# Patient Record
Sex: FEMALE | URBAN - METROPOLITAN AREA
[De-identification: names, ages, dates, MRNs, and addresses within clinical notes are randomized per-mention and may not be internally consistent; named-entity substitution may affect disease eponyms.]

---

## 2021-07-08 ENCOUNTER — OFFICE VISIT (OUTPATIENT)
Dept: URBAN - METROPOLITAN AREA CLINIC 25 | Facility: CLINIC | Age: 76
End: 2021-07-08

## 2024-01-26 ENCOUNTER — OFFICE VISIT (OUTPATIENT)
Dept: URBAN - METROPOLITAN AREA CLINIC 84 | Facility: CLINIC | Age: 79
End: 2024-01-26
Payer: MEDICARE

## 2024-01-26 VITALS
WEIGHT: 229.4 LBS | HEIGHT: 61 IN | BODY MASS INDEX: 43.31 KG/M2 | TEMPERATURE: 97.6 F | HEART RATE: 59 BPM | DIASTOLIC BLOOD PRESSURE: 63 MMHG | SYSTOLIC BLOOD PRESSURE: 131 MMHG

## 2024-01-26 DIAGNOSIS — R10.84 GENERALIZED ABDOMINAL PAIN: ICD-10-CM

## 2024-01-26 DIAGNOSIS — K52.89 (LYMPHOCYTIC) MICROSCOPIC COLITIS: ICD-10-CM

## 2024-01-26 DIAGNOSIS — R11.0 NAUSEA: ICD-10-CM

## 2024-01-26 DIAGNOSIS — E13.43: ICD-10-CM

## 2024-01-26 PROBLEM — 235595009: Status: ACTIVE | Noted: 2024-01-26

## 2024-01-26 PROCEDURE — 99204 OFFICE O/P NEW MOD 45 MIN: CPT | Performed by: INTERNAL MEDICINE

## 2024-01-26 RX ORDER — FAMOTIDINE 20 MG/1
TABLET, FILM COATED ORAL
Qty: 60 TABLET | Status: ON HOLD | COMMUNITY

## 2024-01-26 RX ORDER — RIFAXIMIN 550 MG/1
1 TABLET TABLET ORAL THREE TIMES A DAY
Qty: 90 TABLET | Refills: 5 | OUTPATIENT
Start: 2024-01-26 | End: 2024-07-24

## 2024-01-26 RX ORDER — HYDRALAZINE HYDROCHLORIDE 50 MG/1
TABLET ORAL
Qty: 60 TABLET | Status: ON HOLD | COMMUNITY

## 2024-01-26 RX ORDER — INSULIN ASPART 100 [IU]/ML
INJECTION, SOLUTION INTRAVENOUS; SUBCUTANEOUS
Qty: 30 MILLILITER | Status: ACTIVE | COMMUNITY

## 2024-01-26 RX ORDER — CHOLESTYRAMINE 4 G/9G
POWDER, FOR SUSPENSION ORAL
Qty: 120 PACKET | Status: ON HOLD | COMMUNITY

## 2024-01-26 RX ORDER — METFORMIN ER 500 MG 500 MG/1
TABLET ORAL
Qty: 180 TABLET | Status: ACTIVE | COMMUNITY

## 2024-01-26 RX ORDER — SPIRONOLACTONE 50 MG/1
TABLET ORAL
Qty: 90 TABLET | Status: ACTIVE | COMMUNITY

## 2024-01-26 RX ORDER — ALBUTEROL SULFATE 90 UG/1
AEROSOL, METERED RESPIRATORY (INHALATION)
Qty: 54 GRAM | Status: ACTIVE | COMMUNITY

## 2024-01-26 RX ORDER — INSULIN DETEMIR 100 [IU]/ML
INJECTION, SOLUTION SUBCUTANEOUS
Qty: 10 MILLILITER | Status: ACTIVE | COMMUNITY

## 2024-01-26 RX ORDER — ROSUVASTATIN CALCIUM 10 MG/1
TABLET, FILM COATED ORAL
Qty: 90 TABLET | Status: ACTIVE | COMMUNITY

## 2024-01-26 RX ORDER — AMLODIPINE BESYLATE 10 MG/1
TABLET ORAL
Qty: 90 TABLET | Status: ACTIVE | COMMUNITY

## 2024-01-26 RX ORDER — LOSARTAN POTASSIUM 100 MG/1
TABLET, FILM COATED ORAL
Qty: 90 TABLET | Status: ACTIVE | COMMUNITY

## 2024-01-26 RX ORDER — METOPROLOL SUCCINATE 100 MG/1
TABLET, EXTENDED RELEASE ORAL
Qty: 90 TABLET | Status: ACTIVE | COMMUNITY

## 2024-01-26 RX ORDER — IPRATROPIUM BROMIDE 0.5 MG/2.5ML
SOLUTION RESPIRATORY (INHALATION)
Qty: 62.5 MILLILITER | Status: ACTIVE | COMMUNITY

## 2024-01-26 RX ORDER — FUROSEMIDE 40 MG/1
TABLET ORAL
Qty: 45 TABLET | Status: ACTIVE | COMMUNITY

## 2024-01-26 NOTE — HPI-TODAY'S VISIT:
79 yo pt with DM presents with c/o nausea in the morning and generalized abdominal pain. Sxs have been present for 12 months. Sxs are getting worst. She reports loose, nonbloody stools 4x/d with mucous.

## 2024-02-29 ENCOUNTER — RSN (OUTPATIENT)
Dept: URBAN - METROPOLITAN AREA MEDICAL CENTER 28 | Facility: MEDICAL CENTER | Age: 79
End: 2024-02-29

## 2024-03-28 ENCOUNTER — OV EP (OUTPATIENT)
Dept: URBAN - METROPOLITAN AREA CLINIC 25 | Facility: CLINIC | Age: 79
End: 2024-03-28
Payer: MEDICARE

## 2024-03-28 ENCOUNTER — OV EP (OUTPATIENT)
Dept: URBAN - METROPOLITAN AREA CLINIC 25 | Facility: CLINIC | Age: 79
End: 2024-03-28

## 2024-03-28 VITALS
WEIGHT: 224 LBS | DIASTOLIC BLOOD PRESSURE: 67 MMHG | TEMPERATURE: 97.2 F | HEIGHT: 61 IN | SYSTOLIC BLOOD PRESSURE: 166 MMHG | BODY MASS INDEX: 42.29 KG/M2 | HEART RATE: 62 BPM

## 2024-03-28 DIAGNOSIS — R10.84 GENERALIZED ABDOMINAL PAIN: ICD-10-CM

## 2024-03-28 DIAGNOSIS — E13.43: ICD-10-CM

## 2024-03-28 DIAGNOSIS — K63.89 INTESTINAL BACTERIAL OVERGROWTH: ICD-10-CM

## 2024-03-28 DIAGNOSIS — K52.9 CHRONIC DIARRHEA: ICD-10-CM

## 2024-03-28 PROCEDURE — 99214 OFFICE O/P EST MOD 30 MIN: CPT | Performed by: INTERNAL MEDICINE

## 2024-03-28 RX ORDER — AMLODIPINE BESYLATE 10 MG/1
TABLET ORAL
Qty: 90 TABLET | COMMUNITY

## 2024-03-28 RX ORDER — ROSUVASTATIN CALCIUM 10 MG/1
TABLET, FILM COATED ORAL
Qty: 90 TABLET | COMMUNITY

## 2024-03-28 RX ORDER — INSULIN DETEMIR 100 [IU]/ML
INJECTION, SOLUTION SUBCUTANEOUS
Qty: 10 MILLILITER | COMMUNITY

## 2024-03-28 RX ORDER — RIFAXIMIN 550 MG/1
1 TABLET TABLET ORAL THREE TIMES A DAY
Qty: 90 TABLET | Refills: 5 | COMMUNITY
Start: 2024-01-26 | End: 2024-07-24

## 2024-03-28 RX ORDER — FAMOTIDINE 20 MG/1
TABLET, FILM COATED ORAL
Qty: 60 TABLET | COMMUNITY

## 2024-03-28 RX ORDER — CLONIDINE 0.2 MG/D
1 PATCH TO SKIN PATCH TRANSDERMAL WEEKLY
Qty: 4 | Refills: 1 | OUTPATIENT
Start: 2024-03-28

## 2024-03-28 RX ORDER — HYDRALAZINE HYDROCHLORIDE 50 MG/1
TABLET ORAL
Qty: 60 TABLET | COMMUNITY

## 2024-03-28 RX ORDER — FUROSEMIDE 40 MG/1
TABLET ORAL
Qty: 45 TABLET | COMMUNITY

## 2024-03-28 RX ORDER — RIFAXIMIN 550 MG/1
1 TABLET TABLET ORAL TWICE A DAY
Qty: 60 | Refills: 3 | OUTPATIENT
Start: 2024-03-28 | End: 2024-07-26

## 2024-03-28 RX ORDER — ALBUTEROL SULFATE 90 UG/1
AEROSOL, METERED RESPIRATORY (INHALATION)
Qty: 54 GRAM | COMMUNITY

## 2024-03-28 RX ORDER — METOPROLOL SUCCINATE 100 MG/1
TABLET, EXTENDED RELEASE ORAL
Qty: 90 TABLET | COMMUNITY

## 2024-03-28 RX ORDER — INSULIN ASPART 100 [IU]/ML
INJECTION, SOLUTION INTRAVENOUS; SUBCUTANEOUS
Qty: 30 MILLILITER | COMMUNITY

## 2024-03-28 RX ORDER — SPIRONOLACTONE 50 MG/1
TABLET ORAL
Qty: 90 TABLET | COMMUNITY

## 2024-03-28 RX ORDER — LOSARTAN POTASSIUM 100 MG/1
TABLET, FILM COATED ORAL
Qty: 90 TABLET | COMMUNITY

## 2024-03-28 RX ORDER — METFORMIN ER 500 MG 500 MG/1
TABLET ORAL
Qty: 180 TABLET | COMMUNITY

## 2024-03-28 RX ORDER — IPRATROPIUM BROMIDE 0.5 MG/2.5ML
SOLUTION RESPIRATORY (INHALATION)
Qty: 62.5 MILLILITER | COMMUNITY

## 2024-03-28 RX ORDER — CHOLESTYRAMINE 4 G/9G
POWDER, FOR SUSPENSION ORAL
Qty: 120 PACKET | COMMUNITY

## 2024-03-28 NOTE — PHYSICAL EXAM EYES:
Conjuntivae and eyelids appear normal , Sclerae : White without injection
Initiate Treatment: clindamycin 1 % lotion \\nDays Supply: 30\\nSig: Apply a pea size amount to entire face once a day
Detail Level: Zone

## 2024-05-06 ENCOUNTER — OFFICE VISIT (OUTPATIENT)
Dept: URBAN - METROPOLITAN AREA CLINIC 84 | Facility: CLINIC | Age: 79
End: 2024-05-06

## 2024-05-06 RX ORDER — RIFAXIMIN 550 MG/1
1 TABLET TABLET ORAL TWICE A DAY
Qty: 60 | Refills: 3 | Status: ACTIVE | COMMUNITY
Start: 2024-03-28 | End: 2024-07-26

## 2024-05-06 RX ORDER — ROSUVASTATIN CALCIUM 10 MG/1
TABLET, FILM COATED ORAL
Qty: 90 TABLET | Status: ACTIVE | COMMUNITY

## 2024-05-06 RX ORDER — IPRATROPIUM BROMIDE 0.5 MG/2.5ML
SOLUTION RESPIRATORY (INHALATION)
Qty: 62.5 MILLILITER | Status: ACTIVE | COMMUNITY

## 2024-05-06 RX ORDER — INSULIN ASPART 100 [IU]/ML
INJECTION, SOLUTION INTRAVENOUS; SUBCUTANEOUS
Qty: 30 MILLILITER | Status: ACTIVE | COMMUNITY

## 2024-05-06 RX ORDER — METOPROLOL SUCCINATE 100 MG/1
TABLET, EXTENDED RELEASE ORAL
Qty: 90 TABLET | Status: ACTIVE | COMMUNITY

## 2024-05-06 RX ORDER — FUROSEMIDE 40 MG/1
TABLET ORAL
Qty: 45 TABLET | Status: ACTIVE | COMMUNITY

## 2024-05-06 RX ORDER — LOSARTAN POTASSIUM 100 MG/1
TABLET, FILM COATED ORAL
Qty: 90 TABLET | Status: ACTIVE | COMMUNITY

## 2024-05-06 RX ORDER — SPIRONOLACTONE 50 MG/1
TABLET ORAL
Qty: 90 TABLET | Status: ACTIVE | COMMUNITY

## 2024-05-06 RX ORDER — INSULIN DETEMIR 100 [IU]/ML
INJECTION, SOLUTION SUBCUTANEOUS
Qty: 10 MILLILITER | Status: ACTIVE | COMMUNITY

## 2024-05-06 RX ORDER — RIFAXIMIN 550 MG/1
1 TABLET TABLET ORAL THREE TIMES A DAY
Qty: 90 TABLET | Refills: 5 | Status: ACTIVE | COMMUNITY
Start: 2024-01-26 | End: 2024-07-24

## 2024-05-06 RX ORDER — AMLODIPINE BESYLATE 10 MG/1
TABLET ORAL
Qty: 90 TABLET | Status: ACTIVE | COMMUNITY

## 2024-05-06 RX ORDER — METFORMIN ER 500 MG 500 MG/1
TABLET ORAL
Qty: 180 TABLET | Status: ACTIVE | COMMUNITY

## 2024-05-06 RX ORDER — HYDRALAZINE HYDROCHLORIDE 50 MG/1
TABLET ORAL
Qty: 60 TABLET | Status: ACTIVE | COMMUNITY

## 2024-05-06 RX ORDER — FAMOTIDINE 20 MG/1
TABLET, FILM COATED ORAL
Qty: 60 TABLET | Status: ACTIVE | COMMUNITY

## 2024-05-06 RX ORDER — ALBUTEROL SULFATE 90 UG/1
AEROSOL, METERED RESPIRATORY (INHALATION)
Qty: 54 GRAM | Status: ACTIVE | COMMUNITY

## 2024-05-06 RX ORDER — CHOLESTYRAMINE 4 G/9G
POWDER, FOR SUSPENSION ORAL
Qty: 120 PACKET | Status: ACTIVE | COMMUNITY

## 2024-05-06 RX ORDER — CLONIDINE 0.2 MG/D
1 PATCH TO SKIN PATCH TRANSDERMAL WEEKLY
Qty: 4 | Refills: 1 | Status: ACTIVE | COMMUNITY
Start: 2024-03-28

## 2024-05-13 ENCOUNTER — OFFICE VISIT (OUTPATIENT)
Dept: URBAN - METROPOLITAN AREA CLINIC 84 | Facility: CLINIC | Age: 79
End: 2024-05-13

## 2024-05-22 ENCOUNTER — DASHBOARD ENCOUNTERS (OUTPATIENT)
Age: 79
End: 2024-05-22

## 2024-05-23 ENCOUNTER — OFFICE VISIT (OUTPATIENT)
Dept: URBAN - METROPOLITAN AREA CLINIC 84 | Facility: CLINIC | Age: 79
End: 2024-05-23
Payer: MEDICARE

## 2024-05-23 ENCOUNTER — TELEPHONE ENCOUNTER (OUTPATIENT)
Dept: URBAN - METROPOLITAN AREA CLINIC 84 | Facility: CLINIC | Age: 79
End: 2024-05-23

## 2024-05-23 VITALS
HEART RATE: 56 BPM | SYSTOLIC BLOOD PRESSURE: 150 MMHG | TEMPERATURE: 97.1 F | WEIGHT: 220.6 LBS | HEIGHT: 61 IN | BODY MASS INDEX: 41.65 KG/M2 | DIASTOLIC BLOOD PRESSURE: 67 MMHG

## 2024-05-23 DIAGNOSIS — R68.81 EARLY SATIETY: ICD-10-CM

## 2024-05-23 DIAGNOSIS — R10.13 EPIGASTRIC ABDOMINAL PAIN: ICD-10-CM

## 2024-05-23 DIAGNOSIS — K58.8 OTHER IRRITABLE BOWEL SYNDROME: ICD-10-CM

## 2024-05-23 DIAGNOSIS — R11.0 NAUSEA: ICD-10-CM

## 2024-05-23 PROCEDURE — 99214 OFFICE O/P EST MOD 30 MIN: CPT | Performed by: INTERNAL MEDICINE

## 2024-05-23 RX ORDER — METFORMIN ER 500 MG 500 MG/1
TABLET ORAL
Qty: 180 TABLET | Status: ACTIVE | COMMUNITY

## 2024-05-23 RX ORDER — RIFAXIMIN 550 MG/1
1 TABLET TABLET ORAL TWICE A DAY
Qty: 60 | Refills: 3 | Status: ACTIVE | COMMUNITY
Start: 2024-03-28 | End: 2024-07-26

## 2024-05-23 RX ORDER — CHOLESTYRAMINE 4 G/9G
POWDER, FOR SUSPENSION ORAL
Qty: 120 PACKET | Status: ACTIVE | COMMUNITY

## 2024-05-23 RX ORDER — ALBUTEROL SULFATE 90 UG/1
AEROSOL, METERED RESPIRATORY (INHALATION)
Qty: 54 GRAM | Status: ACTIVE | COMMUNITY

## 2024-05-23 RX ORDER — ROSUVASTATIN CALCIUM 10 MG/1
TABLET, FILM COATED ORAL
Qty: 90 TABLET | Status: ACTIVE | COMMUNITY

## 2024-05-23 RX ORDER — DICYCLOMINE HYDROCHLORIDE 20 MG/1
1- 2 TABLETS TABLET ORAL
Qty: 60 | Refills: 2 | OUTPATIENT
Start: 2024-05-23 | End: 2024-08-21

## 2024-05-23 RX ORDER — IPRATROPIUM BROMIDE 0.5 MG/2.5ML
SOLUTION RESPIRATORY (INHALATION)
Qty: 62.5 MILLILITER | Status: ACTIVE | COMMUNITY

## 2024-05-23 RX ORDER — AMLODIPINE BESYLATE 10 MG/1
TABLET ORAL
Qty: 90 TABLET | Status: ACTIVE | COMMUNITY

## 2024-05-23 RX ORDER — CLONIDINE 0.2 MG/D
1 PATCH TO SKIN PATCH TRANSDERMAL WEEKLY
Qty: 4 | Refills: 1 | Status: ACTIVE | COMMUNITY
Start: 2024-03-28

## 2024-05-23 RX ORDER — FUROSEMIDE 40 MG/1
TABLET ORAL
Qty: 45 TABLET | Status: ACTIVE | COMMUNITY

## 2024-05-23 RX ORDER — RIFAXIMIN 550 MG/1
1 TABLET TABLET ORAL THREE TIMES A DAY
Qty: 90 TABLET | Refills: 5 | Status: ACTIVE | COMMUNITY
Start: 2024-01-26 | End: 2024-07-24

## 2024-05-23 RX ORDER — FAMOTIDINE 20 MG/1
TABLET, FILM COATED ORAL
Qty: 60 TABLET | Status: ACTIVE | COMMUNITY

## 2024-05-23 RX ORDER — SPIRONOLACTONE 50 MG/1
TABLET ORAL
Qty: 90 TABLET | Status: ACTIVE | COMMUNITY

## 2024-05-23 RX ORDER — INSULIN DETEMIR 100 [IU]/ML
INJECTION, SOLUTION SUBCUTANEOUS
Qty: 10 MILLILITER | Status: ACTIVE | COMMUNITY

## 2024-05-23 RX ORDER — HYDRALAZINE HYDROCHLORIDE 50 MG/1
TABLET ORAL
Qty: 60 TABLET | Status: ACTIVE | COMMUNITY

## 2024-05-23 RX ORDER — INSULIN ASPART 100 [IU]/ML
INJECTION, SOLUTION INTRAVENOUS; SUBCUTANEOUS
Qty: 30 MILLILITER | Status: ACTIVE | COMMUNITY

## 2024-05-23 RX ORDER — LOSARTAN POTASSIUM 100 MG/1
TABLET, FILM COATED ORAL
Qty: 90 TABLET | Status: ACTIVE | COMMUNITY

## 2024-05-23 RX ORDER — METOPROLOL SUCCINATE 100 MG/1
TABLET, EXTENDED RELEASE ORAL
Qty: 90 TABLET | Status: ACTIVE | COMMUNITY

## 2024-06-20 ENCOUNTER — OFFICE VISIT (OUTPATIENT)
Dept: URBAN - METROPOLITAN AREA CLINIC 84 | Facility: CLINIC | Age: 79
End: 2024-06-20
Payer: MEDICARE

## 2024-06-20 VITALS
SYSTOLIC BLOOD PRESSURE: 154 MMHG | BODY MASS INDEX: 41.35 KG/M2 | WEIGHT: 219 LBS | TEMPERATURE: 97.7 F | DIASTOLIC BLOOD PRESSURE: 67 MMHG | HEART RATE: 51 BPM | HEIGHT: 61 IN

## 2024-06-20 DIAGNOSIS — K21.9 CHRONIC GERD: ICD-10-CM

## 2024-06-20 DIAGNOSIS — K58.8 OTHER IRRITABLE BOWEL SYNDROME: ICD-10-CM

## 2024-06-20 DIAGNOSIS — R11.0 NAUSEA: ICD-10-CM

## 2024-06-20 DIAGNOSIS — R10.13 EPIGASTRIC ABDOMINAL PAIN: ICD-10-CM

## 2024-06-20 PROCEDURE — 99214 OFFICE O/P EST MOD 30 MIN: CPT | Performed by: INTERNAL MEDICINE

## 2024-06-20 RX ORDER — METFORMIN ER 500 MG 500 MG/1
TABLET ORAL
Qty: 180 TABLET | Status: ACTIVE | COMMUNITY

## 2024-06-20 RX ORDER — FAMOTIDINE 20 MG/1
TABLET, FILM COATED ORAL
Qty: 60 TABLET | Status: ACTIVE | COMMUNITY

## 2024-06-20 RX ORDER — INSULIN ASPART 100 [IU]/ML
INJECTION, SOLUTION INTRAVENOUS; SUBCUTANEOUS
Qty: 30 MILLILITER | Status: ACTIVE | COMMUNITY

## 2024-06-20 RX ORDER — FUROSEMIDE 40 MG/1
TABLET ORAL
Qty: 45 TABLET | Status: ACTIVE | COMMUNITY

## 2024-06-20 RX ORDER — SPIRONOLACTONE 50 MG/1
TABLET ORAL
Qty: 90 TABLET | Status: ACTIVE | COMMUNITY

## 2024-06-20 RX ORDER — IPRATROPIUM BROMIDE 0.5 MG/2.5ML
SOLUTION RESPIRATORY (INHALATION)
Qty: 62.5 MILLILITER | Status: ACTIVE | COMMUNITY

## 2024-06-20 RX ORDER — DICYCLOMINE HYDROCHLORIDE 20 MG/1
1- 2 TABLETS TABLET ORAL
Qty: 60 | Refills: 2 | Status: ACTIVE | COMMUNITY
Start: 2024-05-23 | End: 2024-08-21

## 2024-06-20 RX ORDER — ROSUVASTATIN CALCIUM 10 MG/1
TABLET, FILM COATED ORAL
Qty: 90 TABLET | Status: ACTIVE | COMMUNITY

## 2024-06-20 RX ORDER — ALBUTEROL SULFATE 90 UG/1
AEROSOL, METERED RESPIRATORY (INHALATION)
Qty: 54 GRAM | Status: ACTIVE | COMMUNITY

## 2024-06-20 RX ORDER — CHOLESTYRAMINE 4 G/9G
POWDER, FOR SUSPENSION ORAL
Qty: 120 PACKET | Status: ACTIVE | COMMUNITY

## 2024-06-20 RX ORDER — METOPROLOL SUCCINATE 100 MG/1
TABLET, EXTENDED RELEASE ORAL
Qty: 90 TABLET | Status: ACTIVE | COMMUNITY

## 2024-06-20 RX ORDER — LOSARTAN POTASSIUM 100 MG/1
TABLET, FILM COATED ORAL
Qty: 90 TABLET | Status: ACTIVE | COMMUNITY

## 2024-06-20 RX ORDER — INSULIN DETEMIR 100 [IU]/ML
INJECTION, SOLUTION SUBCUTANEOUS
Qty: 10 MILLILITER | Status: ACTIVE | COMMUNITY

## 2024-06-20 RX ORDER — CLONIDINE 0.2 MG/D
1 PATCH TO SKIN PATCH TRANSDERMAL WEEKLY
Qty: 4 | Refills: 1 | Status: ACTIVE | COMMUNITY
Start: 2024-03-28

## 2024-06-20 RX ORDER — RIFAXIMIN 550 MG/1
1 TABLET TABLET ORAL TWICE A DAY
Qty: 60 | Refills: 3 | Status: ACTIVE | COMMUNITY
Start: 2024-03-28 | End: 2024-07-26

## 2024-06-20 RX ORDER — HYDRALAZINE HYDROCHLORIDE 50 MG/1
TABLET ORAL
Qty: 60 TABLET | Status: ACTIVE | COMMUNITY

## 2024-06-20 RX ORDER — RIFAXIMIN 550 MG/1
1 TABLET TABLET ORAL THREE TIMES A DAY
Qty: 90 TABLET | Refills: 5 | Status: ACTIVE | COMMUNITY
Start: 2024-01-26 | End: 2024-07-24

## 2024-06-20 RX ORDER — AMLODIPINE BESYLATE 10 MG/1
TABLET ORAL
Qty: 90 TABLET | Status: ACTIVE | COMMUNITY

## 2024-06-20 NOTE — HPI-TODAY'S VISIT:
CT Scan was normal exept for the breast mass.  Review of GES from 3/2024 showed normal results.  She continues to have epiagstric pain that gets worse with PO intake.  She has associated nausea but no vomiting.  The pain wakes her from sleep.  She has GERD.  She takes Omeprazole BID.  She has early satiety.  She has a poor appetite and she has lost some weight.  She has intermittent loose stool.  She denies LGI Bleed or melena.  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  She had an Fe infusion a few weeks ago

## 2024-06-20 NOTE — PHYSICAL EXAM CHEST:
chest wall non-tender, breathing is unlabored without accessory muscle use, normal breath sounds 35 35 35

## 2024-09-05 ENCOUNTER — OFFICE VISIT (OUTPATIENT)
Dept: URBAN - METROPOLITAN AREA CLINIC 84 | Facility: CLINIC | Age: 79
End: 2024-09-05
Payer: MEDICARE

## 2024-09-05 VITALS
SYSTOLIC BLOOD PRESSURE: 146 MMHG | HEIGHT: 61 IN | DIASTOLIC BLOOD PRESSURE: 69 MMHG | WEIGHT: 220.6 LBS | HEART RATE: 51 BPM | BODY MASS INDEX: 41.65 KG/M2 | TEMPERATURE: 97 F

## 2024-09-05 DIAGNOSIS — R68.81 EARLY SATIETY: ICD-10-CM

## 2024-09-05 DIAGNOSIS — R11.0 NAUSEA: ICD-10-CM

## 2024-09-05 DIAGNOSIS — R10.84 GENERALIZED ABDOMINAL PAIN: ICD-10-CM

## 2024-09-05 DIAGNOSIS — K58.8 OTHER IRRITABLE BOWEL SYNDROME: ICD-10-CM

## 2024-09-05 PROCEDURE — 99214 OFFICE O/P EST MOD 30 MIN: CPT | Performed by: INTERNAL MEDICINE

## 2024-09-05 RX ORDER — CLONIDINE 0.2 MG/D
1 PATCH TO SKIN PATCH TRANSDERMAL WEEKLY
Qty: 4 | Refills: 1 | Status: ACTIVE | COMMUNITY
Start: 2024-03-28

## 2024-09-05 RX ORDER — ROSUVASTATIN CALCIUM 10 MG/1
TABLET, FILM COATED ORAL
Qty: 90 TABLET | Status: ACTIVE | COMMUNITY

## 2024-09-05 RX ORDER — INSULIN ASPART 100 [IU]/ML
INJECTION, SOLUTION INTRAVENOUS; SUBCUTANEOUS
Qty: 30 MILLILITER | Status: ACTIVE | COMMUNITY

## 2024-09-05 RX ORDER — METOPROLOL SUCCINATE 100 MG/1
TABLET, EXTENDED RELEASE ORAL
Qty: 90 TABLET | Status: ACTIVE | COMMUNITY

## 2024-09-05 RX ORDER — METFORMIN ER 500 MG 500 MG/1
TABLET ORAL
Qty: 180 TABLET | Status: ACTIVE | COMMUNITY

## 2024-09-05 RX ORDER — CHOLESTYRAMINE 4 G/9G
POWDER, FOR SUSPENSION ORAL
Qty: 120 PACKET | Status: ACTIVE | COMMUNITY

## 2024-09-05 RX ORDER — SPIRONOLACTONE 50 MG/1
TABLET ORAL
Qty: 90 TABLET | Status: ACTIVE | COMMUNITY

## 2024-09-05 RX ORDER — LOSARTAN POTASSIUM 100 MG/1
TABLET, FILM COATED ORAL
Qty: 90 TABLET | Status: ACTIVE | COMMUNITY

## 2024-09-05 RX ORDER — FAMOTIDINE 20 MG/1
TABLET, FILM COATED ORAL
Qty: 60 TABLET | Status: ACTIVE | COMMUNITY

## 2024-09-05 RX ORDER — ALBUTEROL SULFATE 90 UG/1
AEROSOL, METERED RESPIRATORY (INHALATION)
Qty: 54 GRAM | Status: ACTIVE | COMMUNITY

## 2024-09-05 RX ORDER — CHOLESTYRAMINE POWDER FOR SUSPENSION 4 G/8.78G
1 PACKET MIXED WITH WATER OR NON-CARBONATED DRINK POWDER, FOR SUSPENSION ORAL
Qty: 90 | Refills: 5 | OUTPATIENT
Start: 2024-09-05

## 2024-09-05 RX ORDER — HYDRALAZINE HYDROCHLORIDE 50 MG/1
TABLET ORAL
Qty: 60 TABLET | Status: ACTIVE | COMMUNITY

## 2024-09-05 RX ORDER — AMLODIPINE BESYLATE 10 MG/1
TABLET ORAL
Qty: 90 TABLET | Status: ACTIVE | COMMUNITY

## 2024-09-05 RX ORDER — IPRATROPIUM BROMIDE 0.5 MG/2.5ML
SOLUTION RESPIRATORY (INHALATION)
Qty: 62.5 MILLILITER | Status: ACTIVE | COMMUNITY

## 2024-09-05 RX ORDER — FUROSEMIDE 40 MG/1
TABLET ORAL
Qty: 45 TABLET | Status: ACTIVE | COMMUNITY

## 2024-09-05 RX ORDER — INSULIN DETEMIR 100 [IU]/ML
INJECTION, SOLUTION SUBCUTANEOUS
Qty: 10 MILLILITER | Status: ACTIVE | COMMUNITY

## 2024-09-05 NOTE — HPI-TODAY'S VISIT:
Patient seen 3 months ago.  Mesenteric doppler study was normal.  She still has the pain.  She does lose her appetite.  It's an epigastric pain.  She has a lot of nausea but no vomiting.  She denies GERD.  She has early satiety.  She has irregualr BM's.  SHe denies LGI Bleed or melena.  She still has frequent diarrhea

## 2024-11-12 ENCOUNTER — OFFICE VISIT (OUTPATIENT)
Dept: URBAN - METROPOLITAN AREA CLINIC 84 | Facility: CLINIC | Age: 79
End: 2024-11-12

## 2024-12-03 ENCOUNTER — OFFICE VISIT (OUTPATIENT)
Dept: URBAN - METROPOLITAN AREA CLINIC 84 | Facility: CLINIC | Age: 79
End: 2024-12-03

## 2024-12-03 RX ORDER — IPRATROPIUM BROMIDE 0.5 MG/2.5ML
SOLUTION RESPIRATORY (INHALATION)
Qty: 62.5 MILLILITER | Status: ACTIVE | COMMUNITY

## 2024-12-03 RX ORDER — CHOLESTYRAMINE 4 G/9G
POWDER, FOR SUSPENSION ORAL
Qty: 120 PACKET | Status: ACTIVE | COMMUNITY

## 2024-12-03 RX ORDER — SPIRONOLACTONE 50 MG/1
TABLET ORAL
Qty: 90 TABLET | Status: ACTIVE | COMMUNITY

## 2024-12-03 RX ORDER — CHOLESTYRAMINE POWDER FOR SUSPENSION 4 G/8.78G
1 PACKET MIXED WITH WATER OR NON-CARBONATED DRINK POWDER, FOR SUSPENSION ORAL
Qty: 90 | Refills: 5 | Status: ACTIVE | COMMUNITY
Start: 2024-09-05

## 2024-12-03 RX ORDER — INSULIN DETEMIR 100 [IU]/ML
INJECTION, SOLUTION SUBCUTANEOUS
Qty: 10 MILLILITER | Status: ACTIVE | COMMUNITY

## 2024-12-03 RX ORDER — METFORMIN ER 500 MG 500 MG/1
TABLET ORAL
Qty: 180 TABLET | Status: ACTIVE | COMMUNITY

## 2024-12-03 RX ORDER — FAMOTIDINE 20 MG/1
TABLET, FILM COATED ORAL
Qty: 60 TABLET | Status: ACTIVE | COMMUNITY

## 2024-12-03 RX ORDER — AMLODIPINE BESYLATE 10 MG/1
TABLET ORAL
Qty: 90 TABLET | Status: ACTIVE | COMMUNITY

## 2024-12-03 RX ORDER — METOPROLOL SUCCINATE 100 MG/1
TABLET, EXTENDED RELEASE ORAL
Qty: 90 TABLET | Status: ACTIVE | COMMUNITY

## 2024-12-03 RX ORDER — HYDRALAZINE HYDROCHLORIDE 50 MG/1
TABLET ORAL
Qty: 60 TABLET | Status: ACTIVE | COMMUNITY

## 2024-12-03 RX ORDER — INSULIN ASPART 100 [IU]/ML
INJECTION, SOLUTION INTRAVENOUS; SUBCUTANEOUS
Qty: 30 MILLILITER | Status: ACTIVE | COMMUNITY

## 2024-12-03 RX ORDER — FUROSEMIDE 40 MG/1
TABLET ORAL
Qty: 45 TABLET | Status: ACTIVE | COMMUNITY

## 2024-12-03 RX ORDER — ALBUTEROL SULFATE 90 UG/1
AEROSOL, METERED RESPIRATORY (INHALATION)
Qty: 54 GRAM | Status: ACTIVE | COMMUNITY

## 2024-12-03 RX ORDER — CLONIDINE 0.2 MG/D
1 PATCH TO SKIN PATCH TRANSDERMAL WEEKLY
Qty: 4 | Refills: 1 | Status: ACTIVE | COMMUNITY
Start: 2024-03-28

## 2024-12-03 RX ORDER — LOSARTAN POTASSIUM 100 MG/1
TABLET, FILM COATED ORAL
Qty: 90 TABLET | Status: ACTIVE | COMMUNITY

## 2024-12-03 RX ORDER — ROSUVASTATIN CALCIUM 10 MG/1
TABLET, FILM COATED ORAL
Qty: 90 TABLET | Status: ACTIVE | COMMUNITY

## 2024-12-11 ENCOUNTER — OFFICE VISIT (OUTPATIENT)
Dept: URBAN - METROPOLITAN AREA CLINIC 25 | Facility: CLINIC | Age: 79
End: 2024-12-11

## 2024-12-13 ENCOUNTER — OFFICE VISIT (OUTPATIENT)
Dept: URBAN - METROPOLITAN AREA CLINIC 84 | Facility: CLINIC | Age: 79
End: 2024-12-13

## 2024-12-13 RX ORDER — CHOLESTYRAMINE POWDER FOR SUSPENSION 4 G/8.78G
1 PACKET MIXED WITH WATER OR NON-CARBONATED DRINK POWDER, FOR SUSPENSION ORAL
Qty: 90 | Refills: 5 | Status: ACTIVE | COMMUNITY
Start: 2024-09-05

## 2024-12-13 RX ORDER — FAMOTIDINE 20 MG/1
TABLET, FILM COATED ORAL
Qty: 60 TABLET | Status: ACTIVE | COMMUNITY

## 2024-12-13 RX ORDER — INSULIN DETEMIR 100 [IU]/ML
INJECTION, SOLUTION SUBCUTANEOUS
Qty: 10 MILLILITER | Status: ACTIVE | COMMUNITY

## 2024-12-13 RX ORDER — METOPROLOL SUCCINATE 100 MG/1
TABLET, EXTENDED RELEASE ORAL
Qty: 90 TABLET | Status: ACTIVE | COMMUNITY

## 2024-12-13 RX ORDER — CLONIDINE 0.2 MG/D
1 PATCH TO SKIN PATCH TRANSDERMAL WEEKLY
Qty: 4 | Refills: 1 | Status: ACTIVE | COMMUNITY
Start: 2024-03-28

## 2024-12-13 RX ORDER — IPRATROPIUM BROMIDE 0.5 MG/2.5ML
SOLUTION RESPIRATORY (INHALATION)
Qty: 62.5 MILLILITER | Status: ACTIVE | COMMUNITY

## 2024-12-13 RX ORDER — CHOLESTYRAMINE 4 G/9G
POWDER, FOR SUSPENSION ORAL
Qty: 120 PACKET | Status: ACTIVE | COMMUNITY

## 2024-12-13 RX ORDER — AMLODIPINE BESYLATE 10 MG/1
TABLET ORAL
Qty: 90 TABLET | Status: ACTIVE | COMMUNITY

## 2024-12-13 RX ORDER — FUROSEMIDE 40 MG/1
TABLET ORAL
Qty: 45 TABLET | Status: ACTIVE | COMMUNITY

## 2024-12-13 RX ORDER — ALBUTEROL SULFATE 90 UG/1
AEROSOL, METERED RESPIRATORY (INHALATION)
Qty: 54 GRAM | Status: ACTIVE | COMMUNITY

## 2024-12-13 RX ORDER — ROSUVASTATIN CALCIUM 10 MG/1
TABLET, FILM COATED ORAL
Qty: 90 TABLET | Status: ACTIVE | COMMUNITY

## 2024-12-13 RX ORDER — METFORMIN ER 500 MG 500 MG/1
TABLET ORAL
Qty: 180 TABLET | Status: ACTIVE | COMMUNITY

## 2024-12-13 RX ORDER — HYDRALAZINE HYDROCHLORIDE 50 MG/1
TABLET ORAL
Qty: 60 TABLET | Status: ACTIVE | COMMUNITY

## 2024-12-13 RX ORDER — INSULIN ASPART 100 [IU]/ML
INJECTION, SOLUTION INTRAVENOUS; SUBCUTANEOUS
Qty: 30 MILLILITER | Status: ACTIVE | COMMUNITY

## 2024-12-13 RX ORDER — LOSARTAN POTASSIUM 100 MG/1
TABLET, FILM COATED ORAL
Qty: 90 TABLET | Status: ACTIVE | COMMUNITY

## 2024-12-13 RX ORDER — SPIRONOLACTONE 50 MG/1
TABLET ORAL
Qty: 90 TABLET | Status: ACTIVE | COMMUNITY

## 2024-12-27 ENCOUNTER — TELEPHONE ENCOUNTER (OUTPATIENT)
Dept: URBAN - METROPOLITAN AREA CLINIC 84 | Facility: CLINIC | Age: 79
End: 2024-12-27

## 2024-12-27 ENCOUNTER — OFFICE VISIT (OUTPATIENT)
Dept: URBAN - METROPOLITAN AREA CLINIC 84 | Facility: CLINIC | Age: 79
End: 2024-12-27
Payer: MEDICARE

## 2024-12-27 VITALS
BODY MASS INDEX: 42.56 KG/M2 | HEIGHT: 61 IN | DIASTOLIC BLOOD PRESSURE: 64 MMHG | TEMPERATURE: 97.2 F | SYSTOLIC BLOOD PRESSURE: 150 MMHG | WEIGHT: 225.4 LBS | HEART RATE: 60 BPM

## 2024-12-27 DIAGNOSIS — K58.9 IBS (IRRITABLE BOWEL SYNDROME) DIARRHEA PREDOMINANT: ICD-10-CM

## 2024-12-27 DIAGNOSIS — R10.13 EPIGASTRIC ABDOMINAL PAIN: ICD-10-CM

## 2024-12-27 DIAGNOSIS — R11.0 NAUSEA: ICD-10-CM

## 2024-12-27 DIAGNOSIS — R68.81 EARLY SATIETY: ICD-10-CM

## 2024-12-27 PROCEDURE — 99214 OFFICE O/P EST MOD 30 MIN: CPT | Performed by: INTERNAL MEDICINE

## 2024-12-27 RX ORDER — IPRATROPIUM BROMIDE 0.5 MG/2.5ML
SOLUTION RESPIRATORY (INHALATION)
Qty: 62.5 MILLILITER | Status: ACTIVE | COMMUNITY

## 2024-12-27 RX ORDER — INSULIN ASPART 100 [IU]/ML
INJECTION, SOLUTION INTRAVENOUS; SUBCUTANEOUS
Qty: 30 MILLILITER | Status: ACTIVE | COMMUNITY

## 2024-12-27 RX ORDER — CLONIDINE 0.2 MG/D
1 PATCH TO SKIN PATCH TRANSDERMAL WEEKLY
Qty: 4 | Refills: 1 | Status: ACTIVE | COMMUNITY
Start: 2024-03-28

## 2024-12-27 RX ORDER — FUROSEMIDE 40 MG/1
TABLET ORAL
Qty: 45 TABLET | Status: ACTIVE | COMMUNITY

## 2024-12-27 RX ORDER — METOPROLOL SUCCINATE 100 MG/1
TABLET, EXTENDED RELEASE ORAL
Qty: 90 TABLET | Status: ACTIVE | COMMUNITY

## 2024-12-27 RX ORDER — METFORMIN ER 500 MG 500 MG/1
TABLET ORAL
Qty: 180 TABLET | Status: ACTIVE | COMMUNITY

## 2024-12-27 RX ORDER — DIPHENOXYLATE HYDROCHLORIDE AND ATROPINE SULFATE 2.5; .025 MG/1; MG/1
1-2 TABLET TABLET ORAL
Qty: 40 | Refills: 2 | OUTPATIENT
Start: 2024-12-27 | End: 2025-03-27

## 2024-12-27 RX ORDER — AMLODIPINE BESYLATE 10 MG/1
TABLET ORAL
Qty: 90 TABLET | Status: ACTIVE | COMMUNITY

## 2024-12-27 RX ORDER — CHOLESTYRAMINE 4 G/9G
POWDER, FOR SUSPENSION ORAL
Qty: 120 PACKET | Status: ACTIVE | COMMUNITY

## 2024-12-27 RX ORDER — FAMOTIDINE 20 MG/1
TABLET, FILM COATED ORAL
Qty: 60 TABLET | Status: ACTIVE | COMMUNITY

## 2024-12-27 RX ORDER — LOSARTAN POTASSIUM 100 MG/1
TABLET, FILM COATED ORAL
Qty: 90 TABLET | Status: ACTIVE | COMMUNITY

## 2024-12-27 RX ORDER — INSULIN DETEMIR 100 [IU]/ML
INJECTION, SOLUTION SUBCUTANEOUS
Qty: 10 MILLILITER | Status: ACTIVE | COMMUNITY

## 2024-12-27 RX ORDER — HYDRALAZINE HYDROCHLORIDE 50 MG/1
TABLET ORAL
Qty: 60 TABLET | Status: ACTIVE | COMMUNITY

## 2024-12-27 RX ORDER — ALBUTEROL SULFATE 90 UG/1
AEROSOL, METERED RESPIRATORY (INHALATION)
Qty: 54 GRAM | Status: ACTIVE | COMMUNITY

## 2024-12-27 RX ORDER — CHOLESTYRAMINE POWDER FOR SUSPENSION 4 G/8.78G
1 PACKET MIXED WITH WATER OR NON-CARBONATED DRINK POWDER, FOR SUSPENSION ORAL
Qty: 90 | Refills: 5 | Status: ACTIVE | COMMUNITY
Start: 2024-09-05

## 2024-12-27 RX ORDER — ROSUVASTATIN CALCIUM 10 MG/1
TABLET, FILM COATED ORAL
Qty: 90 TABLET | Status: ACTIVE | COMMUNITY

## 2024-12-27 RX ORDER — SPIRONOLACTONE 50 MG/1
TABLET ORAL
Qty: 90 TABLET | Status: ACTIVE | COMMUNITY

## 2024-12-27 NOTE — HPI-TODAY'S VISIT:
Patient seen about 3 months ago.  Her nausea is a littlle worse.  She still has irregular BM's.  The Questran was not particulalrly helpful for her diarrhea.  She alslo does not feel that the Imodium is helpful.  SHe is not sure if the Bentyl has been helpful.  She typically has 3-4 loose BM's a day.  She has urgency.  She has mucus in the stool.  She denies LGI Bleed or melena.  The pain is not related to PO intake or to her BM's.  She denies regular nausea.  She never tried the FODMAP diet.

## 2025-01-03 ENCOUNTER — OFFICE VISIT (OUTPATIENT)
Dept: URBAN - METROPOLITAN AREA CLINIC 84 | Facility: CLINIC | Age: 80
End: 2025-01-03

## 2025-04-29 ENCOUNTER — OFFICE VISIT (OUTPATIENT)
Dept: URBAN - METROPOLITAN AREA CLINIC 84 | Facility: CLINIC | Age: 80
End: 2025-04-29

## 2025-04-29 RX ORDER — ROSUVASTATIN CALCIUM 10 MG/1
TABLET, FILM COATED ORAL
Qty: 90 TABLET | Status: ON HOLD | COMMUNITY

## 2025-04-29 RX ORDER — BUDESONIDE AND FORMOTEROL FUMARATE DIHYDRATE 160; 4.5 UG/1; UG/1
AEROSOL RESPIRATORY (INHALATION)
Qty: 10.2 UNSPECIFIED | Status: ACTIVE | COMMUNITY

## 2025-04-29 RX ORDER — INSULIN ASPART 100 [IU]/ML
INJECTION, SOLUTION INTRAVENOUS; SUBCUTANEOUS
Qty: 10 UNSPECIFIED | Status: ACTIVE | COMMUNITY

## 2025-04-29 RX ORDER — BLOOD-GLUCOSE SENSOR
EACH MISCELLANEOUS
Qty: 2 UNSPECIFIED | Status: ACTIVE | COMMUNITY

## 2025-04-29 RX ORDER — AMLODIPINE BESYLATE 10 MG/1
TABLET ORAL
Qty: 90 TABLET | Status: ACTIVE | COMMUNITY

## 2025-04-29 RX ORDER — HYDRALAZINE HYDROCHLORIDE 50 MG/1
TABLET ORAL
Qty: 60 TABLET | Status: ON HOLD | COMMUNITY

## 2025-04-29 RX ORDER — CHOLESTYRAMINE 4 G/9G
POWDER, FOR SUSPENSION ORAL
Qty: 120 PACKET | Status: ON HOLD | COMMUNITY

## 2025-04-29 RX ORDER — FUROSEMIDE 40 MG/1
TABLET ORAL
Qty: 45 TABLET | Status: ON HOLD | COMMUNITY

## 2025-04-29 RX ORDER — INSULIN DEGLUDEC INJECTION 100 U/ML
INJECTION, SOLUTION SUBCUTANEOUS
Qty: 15 UNSPECIFIED | Status: ACTIVE | COMMUNITY

## 2025-04-29 RX ORDER — ALBUTEROL SULFATE 90 UG/1
AEROSOL, METERED RESPIRATORY (INHALATION)
Qty: 54 GRAM | Status: ON HOLD | COMMUNITY

## 2025-04-29 RX ORDER — CHOLESTYRAMINE POWDER FOR SUSPENSION 4 G/8.78G
1 PACKET MIXED WITH WATER OR NON-CARBONATED DRINK POWDER, FOR SUSPENSION ORAL
Qty: 90 | Refills: 5 | Status: ON HOLD | COMMUNITY
Start: 2024-09-05

## 2025-04-29 RX ORDER — METFORMIN ER 500 MG 500 MG/1
TABLET ORAL
Qty: 180 TABLET | Status: ON HOLD | COMMUNITY

## 2025-04-29 RX ORDER — CLONIDINE 0.2 MG/D
1 PATCH TO SKIN PATCH TRANSDERMAL WEEKLY
Qty: 4 | Refills: 1 | Status: ON HOLD | COMMUNITY
Start: 2024-03-28

## 2025-04-29 RX ORDER — OMEPRAZOLE 40 MG/1
CAPSULE, DELAYED RELEASE ORAL
Qty: 180 CAPSULE | Status: ACTIVE | COMMUNITY

## 2025-04-29 RX ORDER — IPRATROPIUM BROMIDE 0.5 MG/2.5ML
SOLUTION RESPIRATORY (INHALATION)
Qty: 62.5 MILLILITER | Status: ON HOLD | COMMUNITY

## 2025-04-29 RX ORDER — SPIRONOLACTONE 25 MG/1
TABLET ORAL
Qty: 90 TABLET | Status: ACTIVE | COMMUNITY

## 2025-04-29 RX ORDER — FAMOTIDINE 20 MG/1
TABLET, FILM COATED ORAL
Qty: 60 TABLET | Status: ON HOLD | COMMUNITY

## 2025-04-29 RX ORDER — INSULIN DETEMIR 100 [IU]/ML
INJECTION, SOLUTION SUBCUTANEOUS
Qty: 10 MILLILITER | Status: ON HOLD | COMMUNITY

## 2025-04-29 RX ORDER — METOPROLOL SUCCINATE 100 MG/1
TABLET, EXTENDED RELEASE ORAL
Qty: 90 TABLET | Status: ON HOLD | COMMUNITY

## 2025-04-29 RX ORDER — BLOOD SUGAR DIAGNOSTIC
STRIP MISCELLANEOUS
Qty: 100 STRIP | Status: ACTIVE | COMMUNITY

## 2025-04-29 RX ORDER — LOSARTAN POTASSIUM 25 MG/1
TABLET, FILM COATED ORAL
Qty: 90 TABLET | Status: ACTIVE | COMMUNITY

## 2025-06-24 ENCOUNTER — OFFICE VISIT (OUTPATIENT)
Dept: URBAN - METROPOLITAN AREA CLINIC 84 | Facility: CLINIC | Age: 80
End: 2025-06-24

## 2025-07-03 ENCOUNTER — OFFICE VISIT (OUTPATIENT)
Dept: URBAN - METROPOLITAN AREA CLINIC 84 | Facility: CLINIC | Age: 80
End: 2025-07-03
Payer: COMMERCIAL

## 2025-07-03 DIAGNOSIS — K58.9 IBS (IRRITABLE BOWEL SYNDROME) DIARRHEA PREDOMINANT: ICD-10-CM

## 2025-07-03 DIAGNOSIS — R11.0 NAUSEA: ICD-10-CM

## 2025-07-03 DIAGNOSIS — R10.13 EPIGASTRIC ABDOMINAL PAIN: ICD-10-CM

## 2025-07-03 PROCEDURE — 99214 OFFICE O/P EST MOD 30 MIN: CPT | Performed by: INTERNAL MEDICINE

## 2025-07-03 RX ORDER — IPRATROPIUM BROMIDE 0.5 MG/2.5ML
SOLUTION RESPIRATORY (INHALATION)
Qty: 62.5 MILLILITER | Status: ON HOLD | COMMUNITY

## 2025-07-03 RX ORDER — BLOOD SUGAR DIAGNOSTIC
STRIP MISCELLANEOUS
Qty: 100 STRIP | Status: ACTIVE | COMMUNITY

## 2025-07-03 RX ORDER — INSULIN DEGLUDEC INJECTION 100 U/ML
INJECTION, SOLUTION SUBCUTANEOUS
Qty: 15 UNSPECIFIED | Status: ACTIVE | COMMUNITY

## 2025-07-03 RX ORDER — ROSUVASTATIN CALCIUM 10 MG/1
TABLET, FILM COATED ORAL
Qty: 90 TABLET | Status: ON HOLD | COMMUNITY

## 2025-07-03 RX ORDER — LOSARTAN POTASSIUM 25 MG/1
TABLET, FILM COATED ORAL
Qty: 90 TABLET | Status: ACTIVE | COMMUNITY

## 2025-07-03 RX ORDER — HYDRALAZINE HYDROCHLORIDE 50 MG/1
TABLET ORAL
Qty: 60 TABLET | Status: ON HOLD | COMMUNITY

## 2025-07-03 RX ORDER — METFORMIN ER 500 MG 500 MG/1
TABLET ORAL
Qty: 180 TABLET | Status: ON HOLD | COMMUNITY

## 2025-07-03 RX ORDER — SPIRONOLACTONE 25 MG/1
TABLET ORAL
Qty: 90 TABLET | Status: ACTIVE | COMMUNITY

## 2025-07-03 RX ORDER — AMLODIPINE BESYLATE 10 MG/1
TABLET ORAL
Qty: 90 TABLET | Status: ACTIVE | COMMUNITY

## 2025-07-03 RX ORDER — ALBUTEROL SULFATE 90 UG/1
AEROSOL, METERED RESPIRATORY (INHALATION)
Qty: 54 GRAM | Status: ON HOLD | COMMUNITY

## 2025-07-03 RX ORDER — INSULIN ASPART 100 [IU]/ML
INJECTION, SOLUTION INTRAVENOUS; SUBCUTANEOUS
Qty: 10 UNSPECIFIED | Status: ACTIVE | COMMUNITY

## 2025-07-03 RX ORDER — FUROSEMIDE 40 MG/1
TABLET ORAL
Qty: 45 TABLET | Status: ON HOLD | COMMUNITY

## 2025-07-03 RX ORDER — INSULIN DETEMIR 100 [IU]/ML
INJECTION, SOLUTION SUBCUTANEOUS
Qty: 10 MILLILITER | Status: ON HOLD | COMMUNITY

## 2025-07-03 RX ORDER — INSULIN ASPART 100 [IU]/ML
INJECTION, SOLUTION INTRAVENOUS; SUBCUTANEOUS
Qty: 30 MILLILITER | Status: ACTIVE | COMMUNITY

## 2025-07-03 RX ORDER — BLOOD-GLUCOSE SENSOR
EACH MISCELLANEOUS
Qty: 2 UNSPECIFIED | Status: ACTIVE | COMMUNITY

## 2025-07-03 RX ORDER — LOSARTAN POTASSIUM 100 MG/1
TABLET, FILM COATED ORAL
Qty: 90 TABLET | Status: ACTIVE | COMMUNITY

## 2025-07-03 RX ORDER — BUDESONIDE AND FORMOTEROL FUMARATE DIHYDRATE 160; 4.5 UG/1; UG/1
AEROSOL RESPIRATORY (INHALATION)
Qty: 10.2 UNSPECIFIED | Status: ACTIVE | COMMUNITY

## 2025-07-03 RX ORDER — SPIRONOLACTONE 50 MG/1
TABLET ORAL
Qty: 90 TABLET | Status: ACTIVE | COMMUNITY

## 2025-07-03 RX ORDER — AMITRIPTYLINE HYDROCHLORIDE 10 MG/1
1 TABLET AT BEDTIME TABLET, FILM COATED ORAL ONCE A DAY
Qty: 30 | Refills: 5 | OUTPATIENT
Start: 2025-07-03

## 2025-07-03 RX ORDER — CHOLESTYRAMINE 4 G/9G
POWDER, FOR SUSPENSION ORAL
Qty: 120 PACKET | Status: ON HOLD | COMMUNITY

## 2025-07-03 RX ORDER — OMEPRAZOLE 40 MG/1
CAPSULE, DELAYED RELEASE ORAL
Qty: 180 CAPSULE | Status: ACTIVE | COMMUNITY

## 2025-07-03 RX ORDER — CLONIDINE 0.2 MG/D
1 PATCH TO SKIN PATCH TRANSDERMAL WEEKLY
Qty: 4 | Refills: 1 | Status: ON HOLD | COMMUNITY
Start: 2024-03-28

## 2025-07-03 RX ORDER — METOPROLOL SUCCINATE 100 MG/1
TABLET, EXTENDED RELEASE ORAL
Qty: 90 TABLET | Status: ON HOLD | COMMUNITY

## 2025-07-03 RX ORDER — CHOLESTYRAMINE POWDER FOR SUSPENSION 4 G/8.78G
1 PACKET MIXED WITH WATER OR NON-CARBONATED DRINK POWDER, FOR SUSPENSION ORAL
Qty: 90 | Refills: 5 | Status: ON HOLD | COMMUNITY
Start: 2024-09-05

## 2025-07-03 RX ORDER — FAMOTIDINE 20 MG/1
TABLET, FILM COATED ORAL
Qty: 60 TABLET | Status: ON HOLD | COMMUNITY

## 2025-07-03 NOTE — HPI-TODAY'S VISIT:
Patient seen about 6 months ago.  She has not been using the Lomotil.  She has alternating diarrhea and formed stool.  She typically has 4 BM's a day.  She has urgency.  The Lomotil does help the diarrhea.  She feels abdominal spasm pain.  She denies LGI Bleed or melena.  The IB Luis was helpful.  She has early satiety with bloat.  She denies weight loss.  She has had nausea but no vomiting.  She has not been seen by Nutrition.  Her DM has been well controlled

## 2025-07-07 NOTE — PHYSICAL EXAM CONSTITUTIONAL:
well developed, well nourished , in no acute distress , ambulating without difficulty , normal communication ability
165.1